# Patient Record
(demographics unavailable — no encounter records)

---

## 2018-10-24 NOTE — NUR
admitted to [facility] via wheelchair from home c/o pain of left ankle. bp 
123/89, o2 sat on rm air-99%. dr bravo will see & evaluate pt.

## 2018-10-24 NOTE — NUR
Patient discharged to home in stable conditon.  Written and verbal after care 
instructions given. 

Patient verbalizes understanding of instructions.pt with so